# Patient Record
Sex: FEMALE | Race: WHITE | Employment: FULL TIME | URBAN - METROPOLITAN AREA
[De-identification: names, ages, dates, MRNs, and addresses within clinical notes are randomized per-mention and may not be internally consistent; named-entity substitution may affect disease eponyms.]

---

## 2017-05-15 DIAGNOSIS — N92.6 MISSED PERIODS: ICD-10-CM

## 2017-05-15 RX ORDER — NORGESTREL AND ETHINYL ESTRADIOL 0.3-0.03MG
KIT ORAL
Qty: 28 TABLET | Refills: 5 | Status: SHIPPED | OUTPATIENT
Start: 2017-05-15 | End: 2017-11-24 | Stop reason: SDUPTHER

## 2017-11-24 DIAGNOSIS — N92.6 MISSED PERIODS: ICD-10-CM

## 2017-11-24 RX ORDER — NORGESTREL AND ETHINYL ESTRADIOL 0.3-0.03MG
KIT ORAL
Qty: 28 TABLET | Refills: 1 | Status: SHIPPED | OUTPATIENT
Start: 2017-11-24 | End: 2018-01-19 | Stop reason: SDUPTHER

## 2018-01-02 ENCOUNTER — OFFICE VISIT (OUTPATIENT)
Dept: URGENT CARE | Facility: CLINIC | Age: 23
End: 2018-01-02
Payer: COMMERCIAL

## 2018-01-02 VITALS
BODY MASS INDEX: 21.6 KG/M2 | SYSTOLIC BLOOD PRESSURE: 135 MMHG | HEART RATE: 132 BPM | TEMPERATURE: 100 F | WEIGHT: 110 LBS | HEIGHT: 60 IN | DIASTOLIC BLOOD PRESSURE: 77 MMHG | OXYGEN SATURATION: 99 %

## 2018-01-02 DIAGNOSIS — J10.1 INFLUENZA A: Primary | ICD-10-CM

## 2018-01-02 LAB
CTP QC/QA: YES
FLUAV AG NPH QL: POSITIVE
FLUBV AG NPH QL: NEGATIVE

## 2018-01-02 PROCEDURE — 87804 INFLUENZA ASSAY W/OPTIC: CPT | Mod: QW,S$GLB,, | Performed by: EMERGENCY MEDICINE

## 2018-01-02 PROCEDURE — 99203 OFFICE O/P NEW LOW 30 MIN: CPT | Mod: S$GLB,,, | Performed by: EMERGENCY MEDICINE

## 2018-01-02 RX ORDER — FLUTICASONE PROPIONATE 50 MCG
SPRAY, SUSPENSION (ML) NASAL
Refills: 2 | COMMUNITY
Start: 2017-12-21 | End: 2018-05-14

## 2018-01-02 RX ORDER — PROMETHAZINE HYDROCHLORIDE AND CODEINE PHOSPHATE 6.25; 1 MG/5ML; MG/5ML
5 SOLUTION ORAL EVERY 4 HOURS PRN
Qty: 120 ML | Refills: 0 | Status: SHIPPED | OUTPATIENT
Start: 2018-01-02 | End: 2018-01-12

## 2018-01-02 RX ORDER — PREDNISONE 20 MG/1
20 TABLET ORAL DAILY
Qty: 3 TABLET | Refills: 0 | Status: SHIPPED | OUTPATIENT
Start: 2018-01-02 | End: 2018-01-05

## 2018-01-02 RX ORDER — AMOXICILLIN 875 MG/1
875 TABLET, FILM COATED ORAL 2 TIMES DAILY
Refills: 0 | COMMUNITY
Start: 2017-11-06 | End: 2018-05-14

## 2018-01-02 RX ORDER — FLUCONAZOLE 150 MG/1
TABLET ORAL
Refills: 0 | COMMUNITY
Start: 2017-11-22 | End: 2018-05-14

## 2018-01-02 RX ORDER — METHYLPREDNISOLONE 4 MG/1
TABLET ORAL
Refills: 0 | COMMUNITY
Start: 2017-11-06 | End: 2018-05-14

## 2018-01-02 NOTE — LETTER
January 2, 2018      [facility]  [address] [place] [address]  Phone: [phone]  Fax: [phone]       Patient: Bhavani Cespedes   YOB: 1995  Date of Visit: 01/02/2018    To Whom It May Concern:    Natalie Cespedes  was at [facility] on 01/02/2018. She may return to work/school on 01/05/2018 with no restrictions. If you have any questions or concerns, or if I can be of further assistance, please do not hesitate to contact me.    Sincerely,    Shlomo Winkler III, MD

## 2018-01-03 NOTE — PATIENT INSTRUCTIONS
Please drink plenty of fluids.  Please get plenty of rest.  Please return here or go to the Emergency Department for any concerns or worsening of condition.  If you were prescribed antibiotics, please take them to completion.  If you were given wait & see antibiotics, please wait 5-7 days before taking them, and only take them if your symptoms have worsened or not improved.  If you do begin taking the antibiotics, please take them to completion.  If you were prescribed a narcotic medication, do not drive or operate heavy equipment or machinery while taking these medications.  If you were given a steroid shot in the clinic and have also been given a prescription for a steroid such as Prednisone or a Medrol Dose Pack, please begin taking them tomorrow.  If you do not have Hypertension or any history of palpitations, it is ok to take over the counter Sudafed or Mucinex D or Allegra-D or Claritin-D or Zyrtec-D.  If you do take one of the above, it is ok to combine that with plain over the counter Mucinex or Allegra or Claritin or Zyrtec.  If for example you are taking Zyrtec -D, you can combine that with Mucinex, but not Mucinex-D.  If you are taking Mucinex-D, you can combine that with plain Allegra or Claritin or Zyrtec.   If you do have Hypertension or palpitations, it is safe to take Coricidin HBP for relief of sinus symptoms.  If not allergic, please take over the counter Tylenol (Acetaminophen) and/or Motrin (Ibuprofen) as directed for control of pain and/or fever.  Please follow up with your primary care doctor or specialist as needed.    If you  smoke, please stop smoking.      Influenza (Adult)    Influenza is also called the flu. It is a viral illness that affects the air passages of your lungs. It is different from the common cold. The flu can easily be passed from one to person to another. It may be spread through the air by coughing and sneezing. Or it can be spread by touching the sick person and then  touching your own eyes, nose, or mouth.  The flu starts 1 to 3 days after you are exposed to the flu virus. It may last for 1 to 2 weeks but many people feel tired or fatigued for many weeks afterward. You usually dont need to take antibiotics unless you have a complication. This might be an ear or sinus infection or pneumonia.  Symptoms of the flu may be mild or severe. They can include extreme tiredness (wanting to stay in bed all day), chills, fevers, muscle aches, soreness with eye movement, headache, and a dry, hacking cough.  Home care  Follow these guidelines when caring for yourself at home:  · Avoid being around cigarette smoke, whether yours or other peoples.  · Acetaminophen or ibuprofen will help ease your fever, muscle aches, and headache. Dont give aspirin to anyone younger than 18 who has the flu. Aspirin can harm the liver.  · Nausea and loss of appetite are common with the flu. Eat light meals. Drink 6 to 8 glasses of liquids every day. Good choices are water, sport drinks, soft drinks without caffeine, juices, tea, and soup. Extra fluids will also help loosen secretions in your nose and lungs.  · Over-the-counter cold medicines will not make the flu go away faster. But the medicines may help with coughing, sore throat, and congestion in your nose and sinuses. Dont use a decongestant if you have high blood pressure.  · Stay home until your fever has been gone for at least 24 hours without using medicine to reduce fever.  Follow-up care  Follow up with your healthcare provider, or as advised, if you are not getting better over the next week.  If you are age 65 or older, talk with your provider about getting a pneumococcal vaccine every 5 years. You should also get this vaccine if you have chronic asthma or COPD. All adults should get a flu vaccine every fall. Ask your provider about this.  When to seek medical advice  Call your healthcare provider right away if any of these occur:  · Cough with  lots of colored mucus (sputum) or blood in your mucus  · Chest pain, shortness of breath, wheezing, or trouble breathing  · Severe headache, or face, neck, or ear pain  · New rash with fever  · Fever of 100.4°F (38°C) or higher, or as directed by your healthcare provider  · Confusion, behavior change, or seizure  · Severe weakness or dizziness  · You get a new fever or cough after getting better for a few days  Date Last Reviewed: 1/1/2017  © 5426-2313 [employer]. [address] [place], [place] [address]. All rights reserved. This information is not intended as a substitute for professional medical care. Always follow your healthcare professional's instructions.

## 2018-01-03 NOTE — PROGRESS NOTES
Subjective:       Patient ID: Bhavani Cespedes is a 22 y.o. female.    Vitals:  height is 5' (1.524 m) and weight is 49.9 kg (110 lb). Her temperature is 100.4 °F (38 °C) (abnormal). Her blood pressure is 135/77 and her pulse is 132 (abnormal). Her oxygen saturation is 99%.     Chief Complaint: Cough and Nasal Congestion    Cough   This is a new problem. The current episode started in the past 7 days. The problem has been gradually worsening. The problem occurs every few minutes. The cough is non-productive. Associated symptoms include chills, a fever, headaches, nasal congestion, postnasal drip and a sore throat. Pertinent negatives include no chest pain, ear pain, eye redness, myalgias, rash, shortness of breath or wheezing. The symptoms are aggravated by lying down and cold air. She has tried nothing for the symptoms. The treatment provided no relief.     Review of Systems   Constitution: Positive for chills, decreased appetite, fever and malaise/fatigue.   HENT: Positive for congestion, postnasal drip and sore throat. Negative for ear pain and hoarse voice.    Eyes: Negative for blurred vision, discharge and redness.   Cardiovascular: Negative for chest pain, dyspnea on exertion and leg swelling.   Respiratory: Positive for cough and sputum production. Negative for shortness of breath and wheezing.    Skin: Negative for rash.   Musculoskeletal: Positive for back pain and joint pain. Negative for myalgias.   Gastrointestinal: Negative for abdominal pain, diarrhea, nausea and vomiting.   Neurological: Positive for headaches.        Frontal lobe   Psychiatric/Behavioral: The patient is not nervous/anxious.        Objective:      Physical Exam   Constitutional: She is oriented to person, place, and time. She appears well-developed and well-nourished. She is cooperative.  Non-toxic appearance. She does not appear ill. No distress.   Non productive cough present through out the exam     HENT:   Head: Normocephalic and  atraumatic.   Right Ear: Hearing, external ear and ear canal normal. A middle ear effusion is present.   Left Ear: Hearing, external ear and ear canal normal. A middle ear effusion is present.   Nose: Mucosal edema and rhinorrhea present. No nasal deformity. No epistaxis. Right sinus exhibits no maxillary sinus tenderness and no frontal sinus tenderness. Left sinus exhibits no maxillary sinus tenderness and no frontal sinus tenderness.   Mouth/Throat: Uvula is midline and mucous membranes are normal. No trismus in the jaw. Normal dentition. No uvula swelling. Posterior oropharyngeal erythema present.   Eyes: Conjunctivae and lids are normal. No scleral icterus.   Sclera clear bilat   Neck: Trachea normal, full passive range of motion without pain and phonation normal. Neck supple.   Cardiovascular: Normal rate, regular rhythm, normal heart sounds, intact distal pulses and normal pulses.    Pulmonary/Chest: Effort normal and breath sounds normal. No respiratory distress.   Abdominal: Soft. Normal appearance and bowel sounds are normal. She exhibits no distension. There is no tenderness.   Musculoskeletal: Normal range of motion. She exhibits no edema or deformity.   Neurological: She is alert and oriented to person, place, and time. She exhibits normal muscle tone. Coordination normal.   Skin: Skin is warm, dry and intact. She is not diaphoretic. No pallor.   Psychiatric: She has a normal mood and affect. Her speech is normal and behavior is normal. Judgment and thought content normal. Cognition and memory are normal.   Nursing note and vitals reviewed.      Assessment:       1. Influenza A        Plan:         Influenza A  -     POCT Influenza A/B  -     predniSONE (DELTASONE) 20 MG tablet; Take 1 tablet (20 mg total) by mouth once daily.  Dispense: 3 tablet; Refill: 0  -     promethazine-codeine 6.25-10 mg/5 ml (PHENERGAN WITH CODEINE) 6.25-10 mg/5 mL syrup; Take 5 mLs by mouth every 4 (four) hours as needed for  Cough.  Dispense: 120 mL; Refill: 0

## 2018-01-19 DIAGNOSIS — N92.6 MISSED PERIODS: ICD-10-CM

## 2018-01-19 RX ORDER — NORGESTREL AND ETHINYL ESTRADIOL 0.3-0.03MG
KIT ORAL
Qty: 28 TABLET | Refills: 1 | Status: SHIPPED | OUTPATIENT
Start: 2018-01-19 | End: 2018-02-26 | Stop reason: SDUPTHER

## 2018-01-28 ENCOUNTER — OFFICE VISIT (OUTPATIENT)
Dept: URGENT CARE | Facility: CLINIC | Age: 23
End: 2018-01-28
Payer: COMMERCIAL

## 2018-01-28 VITALS — WEIGHT: 105 LBS | HEIGHT: 60 IN | BODY MASS INDEX: 20.62 KG/M2

## 2018-01-28 DIAGNOSIS — K52.9 GASTROENTERITIS, ACUTE: ICD-10-CM

## 2018-01-28 DIAGNOSIS — R10.84 GENERALIZED ABDOMINAL PAIN: Primary | ICD-10-CM

## 2018-01-28 LAB
BILIRUB UR QL STRIP: NEGATIVE
GLUCOSE UR QL STRIP: NEGATIVE
KETONES UR QL STRIP: NEGATIVE
LEUKOCYTE ESTERASE UR QL STRIP: NEGATIVE
PH, POC UA: 5 (ref 5–8)
POC BLOOD, URINE: NEGATIVE
POC NITRATES, URINE: NEGATIVE
PROT UR QL STRIP: POSITIVE
SP GR UR STRIP: 1.01 (ref 1–1.03)
UROBILINOGEN UR STRIP-ACNC: NORMAL (ref 0.1–1.1)

## 2018-01-28 PROCEDURE — 96372 THER/PROPH/DIAG INJ SC/IM: CPT | Mod: S$GLB,,, | Performed by: INTERNAL MEDICINE

## 2018-01-28 PROCEDURE — 81003 URINALYSIS AUTO W/O SCOPE: CPT | Mod: QW,S$GLB,, | Performed by: INTERNAL MEDICINE

## 2018-01-28 PROCEDURE — 99213 OFFICE O/P EST LOW 20 MIN: CPT | Mod: 25,S$GLB,, | Performed by: INTERNAL MEDICINE

## 2018-01-28 RX ORDER — ONDANSETRON 4 MG/1
4 TABLET, FILM COATED ORAL EVERY 8 HOURS PRN
Qty: 20 TABLET | Refills: 0 | Status: SHIPPED | OUTPATIENT
Start: 2018-01-28 | End: 2018-05-14

## 2018-01-28 RX ORDER — ONDANSETRON 2 MG/ML
4 INJECTION INTRAMUSCULAR; INTRAVENOUS
Status: COMPLETED | OUTPATIENT
Start: 2018-01-28 | End: 2018-01-28

## 2018-01-28 RX ADMIN — ONDANSETRON 4 MG: 2 INJECTION INTRAMUSCULAR; INTRAVENOUS at 10:01

## 2018-01-28 NOTE — PROGRESS NOTES
Subjective:       Patient ID: Bhavani Cespedes is a 22 y.o. female.    Vitals:  height is 5' (1.524 m) and weight is 47.6 kg (105 lb).     Chief Complaint: Abdominal Pain; Nausea; and Emesis    Abdominal Pain   This is a new problem. The current episode started today. The problem occurs intermittently. The problem has been resolved (now having low back pain ). The pain is located in the left flank and right flank. The pain is at a severity of 6/10. The pain is moderate. The quality of the pain is aching and cramping. Associated symptoms include nausea and vomiting. Pertinent negatives include no diarrhea, fever or headaches. The pain is aggravated by certain positions, eating and vomiting. The pain is relieved by nothing. She has tried nothing for the symptoms. The treatment provided no relief.   Nausea   This is a new problem. The current episode started today. The problem occurs intermittently. The problem has been gradually worsening. Associated symptoms include abdominal pain, nausea and vomiting. Pertinent negatives include no chest pain, chills, fever, headaches, rash or sore throat. The symptoms are aggravated by walking and eating. She has tried nothing for the symptoms. The treatment provided no relief.   Emesis    This is a new problem. The current episode started today. The problem occurs less than 2 times per day. The problem has been gradually worsening. The emesis has an appearance of bile. There has been no fever. Associated symptoms include abdominal pain. Pertinent negatives include no chest pain, chills, diarrhea, fever or headaches. She has tried nothing for the symptoms. The treatment provided no relief.     Review of Systems   Constitution: Negative for chills and fever.   HENT: Negative for sore throat.    Eyes: Negative for blurred vision.   Cardiovascular: Negative for chest pain.   Respiratory: Negative for shortness of breath.    Skin: Negative for rash.   Musculoskeletal: Positive for back  pain. Negative for joint pain.   Gastrointestinal: Positive for abdominal pain, nausea and vomiting. Negative for diarrhea.   Neurological: Negative for headaches.   Psychiatric/Behavioral: The patient is not nervous/anxious.        Objective:      Physical Exam   Constitutional: She appears well-developed and well-nourished. She appears ill.   HENT:   Head: Normocephalic and atraumatic.   Eyes: Conjunctivae and EOM are normal. Pupils are equal, round, and reactive to light.   Neck: Normal range of motion. Neck supple.   Cardiovascular: Normal rate and regular rhythm.    Pulmonary/Chest: Effort normal and breath sounds normal.   Abdominal: Soft.   Mild generalized discomfort to palpation with some guarding in LLQ; no rebound;no masses or organomegaly   Vitals reviewed.      Assessment:       1. Generalized abdominal pain    2. Gastroenteritis, acute        Plan:         Generalized abdominal pain  -     POCT Urinalysis, Dipstick, Automated, W/O Scope    Gastroenteritis, acute  -     ondansetron injection 4 mg; Inject 4 mg into the muscle one time.  -     ondansetron (ZOFRAN, AS HYDROCHLORIDE,) 4 MG tablet; Take 1 tablet (4 mg total) by mouth every 8 (eight) hours as needed for Nausea.  Dispense: 20 tablet; Refill: 0

## 2018-02-26 ENCOUNTER — TELEPHONE (OUTPATIENT)
Dept: OBSTETRICS AND GYNECOLOGY | Facility: CLINIC | Age: 23
End: 2018-02-26

## 2018-02-26 DIAGNOSIS — N92.6 MISSED PERIODS: ICD-10-CM

## 2018-02-28 DIAGNOSIS — N92.6 MISSED PERIODS: ICD-10-CM

## 2018-03-23 DIAGNOSIS — N92.6 MISSED PERIODS: ICD-10-CM

## 2018-03-23 RX ORDER — NORGESTREL AND ETHINYL ESTRADIOL 0.3-0.03MG
KIT ORAL
Qty: 28 TABLET | Refills: 1 | Status: SHIPPED | OUTPATIENT
Start: 2018-03-23 | End: 2018-05-14 | Stop reason: SDUPTHER

## 2018-05-14 ENCOUNTER — OFFICE VISIT (OUTPATIENT)
Dept: OBSTETRICS AND GYNECOLOGY | Facility: CLINIC | Age: 23
End: 2018-05-14
Attending: OBSTETRICS & GYNECOLOGY
Payer: COMMERCIAL

## 2018-05-14 ENCOUNTER — LAB VISIT (OUTPATIENT)
Dept: LAB | Facility: OTHER | Age: 23
End: 2018-05-14
Attending: OBSTETRICS & GYNECOLOGY
Payer: COMMERCIAL

## 2018-05-14 VITALS
HEIGHT: 60 IN | WEIGHT: 108 LBS | BODY MASS INDEX: 21.2 KG/M2 | SYSTOLIC BLOOD PRESSURE: 116 MMHG | DIASTOLIC BLOOD PRESSURE: 80 MMHG

## 2018-05-14 DIAGNOSIS — Z01.419 ENCOUNTER FOR GYNECOLOGICAL EXAMINATION (GENERAL) (ROUTINE) WITHOUT ABNORMAL FINDINGS: Primary | ICD-10-CM

## 2018-05-14 DIAGNOSIS — N92.6 MISSED PERIODS: ICD-10-CM

## 2018-05-14 DIAGNOSIS — N92.0 MENORRHAGIA WITH REGULAR CYCLE: ICD-10-CM

## 2018-05-14 DIAGNOSIS — R53.83 FATIGUE, UNSPECIFIED TYPE: ICD-10-CM

## 2018-05-14 LAB
BASOPHILS # BLD AUTO: 0.01 K/UL
BASOPHILS NFR BLD: 0.2 %
DIFFERENTIAL METHOD: NORMAL
EOSINOPHIL # BLD AUTO: 0.1 K/UL
EOSINOPHIL NFR BLD: 2.6 %
ERYTHROCYTE [DISTWIDTH] IN BLOOD BY AUTOMATED COUNT: 12.7 %
HCT VFR BLD AUTO: 39.9 %
HGB BLD-MCNC: 13 G/DL
LYMPHOCYTES # BLD AUTO: 1.8 K/UL
LYMPHOCYTES NFR BLD: 35.3 %
MCH RBC QN AUTO: 30.3 PG
MCHC RBC AUTO-ENTMCNC: 32.6 G/DL
MCV RBC AUTO: 93 FL
MONOCYTES # BLD AUTO: 0.4 K/UL
MONOCYTES NFR BLD: 8.3 %
NEUTROPHILS # BLD AUTO: 2.7 K/UL
NEUTROPHILS NFR BLD: 53.6 %
PLATELET # BLD AUTO: 288 K/UL
PMV BLD AUTO: 11.6 FL
RBC # BLD AUTO: 4.29 M/UL
TSH SERPL DL<=0.005 MIU/L-ACNC: 0.81 UIU/ML
WBC # BLD AUTO: 5.07 K/UL

## 2018-05-14 PROCEDURE — 99999 PR PBB SHADOW E&M-EST. PATIENT-LVL III: CPT | Mod: PBBFAC,,, | Performed by: OBSTETRICS & GYNECOLOGY

## 2018-05-14 PROCEDURE — 85025 COMPLETE CBC W/AUTO DIFF WBC: CPT

## 2018-05-14 PROCEDURE — 99395 PREV VISIT EST AGE 18-39: CPT | Mod: S$GLB,,, | Performed by: OBSTETRICS & GYNECOLOGY

## 2018-05-14 PROCEDURE — 36415 COLL VENOUS BLD VENIPUNCTURE: CPT

## 2018-05-14 PROCEDURE — 84443 ASSAY THYROID STIM HORMONE: CPT

## 2018-05-14 RX ORDER — NORGESTREL AND ETHINYL ESTRADIOL 0.3-0.03MG
KIT ORAL
Qty: 28 TABLET | Refills: 1 | Status: SHIPPED | OUTPATIENT
Start: 2018-05-14 | End: 2018-08-30 | Stop reason: SDUPTHER

## 2018-05-14 NOTE — PROGRESS NOTES
Subjective:       Patient ID: Bhavani Cespedes is a 23 y.o. female.    Chief Complaint:  Well Woman (10/07/16-pap-negative )      History of Present Illness  23 year old here for annual exam.  Patient had heavy period but improved with her current birth control.  Moderate cramps for one day.  Patient worried about vulvar skin tags.  No pelvic pain or breat concerns.      GYN & OB History  Patient's last menstrual period was 2018.   Date of Last Pap: 10/13/2016    OB History    Para Term  AB Living   0 0 0 0 0 0   SAB TAB Ectopic Multiple Live Births   0 0 0 0               History reviewed. No pertinent past medical history.    History reviewed. No pertinent surgical history.    Review of Systems  Review of Systems   Constitutional: Negative for fatigue.   Respiratory: Negative for shortness of breath.    Cardiovascular: Negative for chest pain.   Gastrointestinal: Negative for abdominal pain, constipation, diarrhea and nausea.   Genitourinary: Negative for dyspareunia, dysuria, menstrual problem, pelvic pain and vaginal pain.   Musculoskeletal: Negative for back pain.   Skin: Negative for rash.   Neurological: Negative for headaches.   Hematological: Negative for adenopathy.   Psychiatric/Behavioral: Negative for dysphoric mood. The patient is not nervous/anxious.         Objective:   Physical Exam:   Constitutional: She is oriented to person, place, and time. She appears well-developed and well-nourished.      Neck: No thyromegaly present.    Cardiovascular: Normal rate.     Pulmonary/Chest: Effort normal and breath sounds normal. Right breast exhibits no mass, no nipple discharge, no skin change, no tenderness and no bleeding. Left breast exhibits no mass, no nipple discharge, no skin change, no tenderness and no bleeding.        Abdominal: Soft. Normal appearance and bowel sounds are normal. She exhibits no distension and no mass. There is no tenderness. There is no rebound and no guarding.      Genitourinary: Vagina normal and uterus normal.       Pelvic exam was performed with patient supine. There is no rash, tenderness, lesion or injury on the right labia. There is no rash, tenderness, lesion or injury on the left labia. Uterus is not enlarged, not fixed and not tender. Cervix is normal. Right adnexum displays no mass, no tenderness and no fullness. Left adnexum displays no mass, no tenderness and no fullness. No erythema, tenderness, rectocele, cystocele or unspecified prolapse of vaginal walls in the vagina. No signs of injury around the vagina. No vaginal discharge found. Cervix exhibits no motion tenderness, no discharge and no friability.           Musculoskeletal: Normal range of motion and moves all extremeties.      Lymphadenopathy:     She has no axillary adenopathy.        Right: No supraclavicular adenopathy present.        Left: No supraclavicular adenopathy present.    Neurological: She is alert and oriented to person, place, and time.    Skin: Skin is warm and dry.    Psychiatric: She has a normal mood and affect. Her behavior is normal. Judgment normal.        Assessment/ Plan:     Encounter for gynecological examination (general) (routine) without abnormal findings    Missed periods  -     CRYSELLE, 28, 0.3-30 mg-mcg per tablet; TAKE 1 TABLET BY MOUTH EVERY DAY. MUST BE THIS BRAND  Dispense: 28 tablet; Refill: 1    Menorrhagia with regular cycle  -     CBC auto differential; Future    Fatigue, unspecified type  -     TSH; Future; Expected date: 05/14/2018    will return for lesion removal      Follow-up in about 1 year (around 5/14/2019).    Patient was counseled today on A.C.S. Pap guidelines and recommendations for yearly pelvic exams, mammograms and monthly self breast exams; to see her PCP for other health maintenance.

## 2018-05-17 DIAGNOSIS — N92.6 MISSED PERIODS: ICD-10-CM

## 2018-05-17 RX ORDER — NORGESTREL AND ETHINYL ESTRADIOL 0.3-0.03MG
KIT ORAL
Qty: 28 TABLET | Refills: 1 | Status: SHIPPED | OUTPATIENT
Start: 2018-05-17 | End: 2018-07-11

## 2018-06-06 ENCOUNTER — PATIENT MESSAGE (OUTPATIENT)
Dept: OBSTETRICS AND GYNECOLOGY | Facility: CLINIC | Age: 23
End: 2018-06-06

## 2018-06-15 ENCOUNTER — PATIENT MESSAGE (OUTPATIENT)
Dept: OBSTETRICS AND GYNECOLOGY | Facility: CLINIC | Age: 23
End: 2018-06-15

## 2018-06-19 ENCOUNTER — PATIENT MESSAGE (OUTPATIENT)
Dept: OBSTETRICS AND GYNECOLOGY | Facility: CLINIC | Age: 23
End: 2018-06-19

## 2018-06-22 RX ORDER — SERTRALINE HYDROCHLORIDE 25 MG/1
25 TABLET, FILM COATED ORAL DAILY
Qty: 30 TABLET | Refills: 2 | Status: SHIPPED | OUTPATIENT
Start: 2018-06-22 | End: 2019-02-19

## 2018-07-11 ENCOUNTER — PROCEDURE VISIT (OUTPATIENT)
Dept: OBSTETRICS AND GYNECOLOGY | Facility: CLINIC | Age: 23
End: 2018-07-11
Attending: OBSTETRICS & GYNECOLOGY
Payer: COMMERCIAL

## 2018-07-11 VITALS — BODY MASS INDEX: 21.1 KG/M2 | DIASTOLIC BLOOD PRESSURE: 76 MMHG | SYSTOLIC BLOOD PRESSURE: 128 MMHG | WEIGHT: 108 LBS

## 2018-07-11 DIAGNOSIS — L91.8 SKIN TAG: Primary | ICD-10-CM

## 2018-07-11 PROCEDURE — 88305 TISSUE EXAM BY PATHOLOGIST: CPT | Performed by: PATHOLOGY

## 2018-07-11 PROCEDURE — 88342 IMHCHEM/IMCYTCHM 1ST ANTB: CPT | Mod: 26,,, | Performed by: PATHOLOGY

## 2018-07-18 NOTE — PROCEDURES
Procedures   After informed consent the patient was was given 5 cc of local lidocaine to the areas on her left posterior fornix where skin tags were noted.  The patient tolerated the removal of all 3 with Metzenbaum scissors.  Silver nitrate was used for hemostasis.  The patient tolerated the procedure and the specimens were sent to pathology.

## 2018-08-30 ENCOUNTER — PATIENT MESSAGE (OUTPATIENT)
Dept: OBSTETRICS AND GYNECOLOGY | Facility: CLINIC | Age: 23
End: 2018-08-30

## 2018-08-30 DIAGNOSIS — N92.6 MISSED PERIODS: ICD-10-CM

## 2018-08-30 RX ORDER — NORGESTREL AND ETHINYL ESTRADIOL 0.3-0.03MG
KIT ORAL
Qty: 84 TABLET | Refills: 2 | Status: SHIPPED | OUTPATIENT
Start: 2018-08-30 | End: 2019-02-19 | Stop reason: SDUPTHER

## 2018-09-02 DIAGNOSIS — N92.6 MISSED PERIODS: ICD-10-CM

## 2018-09-04 RX ORDER — NORGESTREL AND ETHINYL ESTRADIOL 0.3-0.03MG
KIT ORAL
Qty: 28 TABLET | Refills: 1 | Status: SHIPPED | OUTPATIENT
Start: 2018-09-04 | End: 2018-11-16 | Stop reason: SDUPTHER

## 2018-10-29 ENCOUNTER — PATIENT MESSAGE (OUTPATIENT)
Dept: OBSTETRICS AND GYNECOLOGY | Facility: CLINIC | Age: 23
End: 2018-10-29

## 2018-11-16 DIAGNOSIS — N92.6 MISSED PERIODS: ICD-10-CM

## 2018-11-16 RX ORDER — NORGESTREL AND ETHINYL ESTRADIOL 0.3-0.03MG
1 KIT ORAL DAILY
Qty: 84 TABLET | Refills: 3 | Status: SHIPPED | OUTPATIENT
Start: 2018-11-16 | End: 2020-01-24

## 2019-01-13 ENCOUNTER — PATIENT MESSAGE (OUTPATIENT)
Dept: OBSTETRICS AND GYNECOLOGY | Facility: CLINIC | Age: 24
End: 2019-01-13

## 2019-01-14 NOTE — TELEPHONE ENCOUNTER
Patient is aware that you are out of clinic today. I will offer her an appointment, but does she need an ultrasound?

## 2019-02-01 ENCOUNTER — TELEPHONE (OUTPATIENT)
Dept: OBSTETRICS AND GYNECOLOGY | Facility: CLINIC | Age: 24
End: 2019-02-01

## 2019-02-01 DIAGNOSIS — N94.6 DYSMENORRHEA: Primary | ICD-10-CM

## 2019-02-01 NOTE — TELEPHONE ENCOUNTER
Pt scheduled herself on the portal for today but there is no u/s available. Called pt to reschedule for next available appt with an u/s.

## 2019-02-05 ENCOUNTER — PATIENT MESSAGE (OUTPATIENT)
Dept: OBSTETRICS AND GYNECOLOGY | Facility: CLINIC | Age: 24
End: 2019-02-05

## 2019-02-19 ENCOUNTER — OFFICE VISIT (OUTPATIENT)
Dept: OBSTETRICS AND GYNECOLOGY | Facility: CLINIC | Age: 24
End: 2019-02-19
Attending: OBSTETRICS & GYNECOLOGY
Payer: COMMERCIAL

## 2019-02-19 ENCOUNTER — PATIENT MESSAGE (OUTPATIENT)
Dept: OBSTETRICS AND GYNECOLOGY | Facility: CLINIC | Age: 24
End: 2019-02-19

## 2019-02-19 VITALS
SYSTOLIC BLOOD PRESSURE: 124 MMHG | HEIGHT: 60 IN | WEIGHT: 115.5 LBS | BODY MASS INDEX: 22.68 KG/M2 | DIASTOLIC BLOOD PRESSURE: 76 MMHG

## 2019-02-19 DIAGNOSIS — N94.6 DYSMENORRHEA: Primary | ICD-10-CM

## 2019-02-19 PROCEDURE — 99213 PR OFFICE/OUTPT VISIT, EST, LEVL III, 20-29 MIN: ICD-10-PCS | Mod: S$GLB,,, | Performed by: OBSTETRICS & GYNECOLOGY

## 2019-02-19 PROCEDURE — 3008F PR BODY MASS INDEX (BMI) DOCUMENTED: ICD-10-PCS | Mod: CPTII,S$GLB,, | Performed by: OBSTETRICS & GYNECOLOGY

## 2019-02-19 PROCEDURE — 99213 OFFICE O/P EST LOW 20 MIN: CPT | Mod: S$GLB,,, | Performed by: OBSTETRICS & GYNECOLOGY

## 2019-02-19 PROCEDURE — 3008F BODY MASS INDEX DOCD: CPT | Mod: CPTII,S$GLB,, | Performed by: OBSTETRICS & GYNECOLOGY

## 2019-02-19 PROCEDURE — 99999 PR PBB SHADOW E&M-EST. PATIENT-LVL III: CPT | Mod: PBBFAC,,, | Performed by: OBSTETRICS & GYNECOLOGY

## 2019-02-19 PROCEDURE — 99999 PR PBB SHADOW E&M-EST. PATIENT-LVL III: ICD-10-PCS | Mod: PBBFAC,,, | Performed by: OBSTETRICS & GYNECOLOGY

## 2019-02-19 RX ORDER — LEVONORGESTREL / ETHINYL ESTRADIOL AND ETHINYL ESTRADIOL 150-30(84)
1 KIT ORAL DAILY
Qty: 90 EACH | Refills: 3 | Status: SHIPPED | OUTPATIENT
Start: 2019-02-19 | End: 2019-03-22

## 2019-02-19 RX ORDER — SERTRALINE HYDROCHLORIDE 25 MG/1
25 TABLET, FILM COATED ORAL DAILY
Qty: 30 TABLET | Refills: 2 | Status: SHIPPED | OUTPATIENT
Start: 2019-02-19 | End: 2021-09-28 | Stop reason: SDUPTHER

## 2019-02-21 NOTE — PROGRESS NOTES
Counseling Note:    Subjective:       Patient ID: Bhavani Cespedes is a 23 y.o. female.    Chief Complaint:  Dysmenorrhea (pt c/o worsening dysmenorrhea and BTB)      History of Present Illness  HPI  23 year old with 1-2 days of pelvic pain at the start of her periods.  Patient had a period 2/5 and is taking cryselle as a birth control pill.  Patient had worse pelvic pain with periods before medication.  Discussed ultrasound being normal.  Patient disliked depo provera for BTB and mirena.  Interested in trial of different extended birth control pill.  Not currently having any pain.  Reports no new sex partners.  No vaginal discharge.    Patient cleared by hematologist to be on birth control.  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  Her father does.    Moods slightly improved but reports sill having trouble with sleeping    GYN & OB History  Patient's last menstrual period was 02/05/2019 (approximate).   Date of Last Pap: 10/13/2016    History reviewed. No pertinent past medical history.    History reviewed. No pertinent surgical history.    Outpatient Encounter Medications as of 2/19/2019   Medication Sig Dispense Refill    CRYSELLE, 28, 0.3-30 mg-mcg per tablet Take 1 tablet by mouth once daily. 84 tablet 3    L norgest/e.estradiol-e.estrad (SEASONIQUE) 0.15 mg-30 mcg (84)/10 mcg (7) 3MPk Take 1 tablet by mouth once daily. 90 each 3    sertraline (ZOLOFT) 25 MG tablet Take 1 tablet (25 mg total) by mouth once daily. 30 tablet 2    [DISCONTINUED] CRYSELLE, 28, 0.3-30 mg-mcg per tablet TAKE 1 TABLET BY MOUTH EVERY DAY. MUST BE THIS BRAND 84 tablet 2    [DISCONTINUED] sertraline (ZOLOFT) 25 MG tablet Take 1 tablet (25 mg total) by mouth once daily. 30 tablet 2     No facility-administered encounter medications on file as of 2/19/2019.          Review of Systems  Review of Systems   Gastrointestinal: Negative for abdominal distention.   Genitourinary: Positive for pelvic pain (only with the first 2 days of her period.). Negative  for dyspareunia, dysuria, menstrual problem, vaginal bleeding and vaginal discharge.   Psychiatric/Behavioral: Positive for sleep disturbance. Negative for dysphoric mood. The patient is nervous/anxious.            Objective:      No acute distress.         Assessment:        1. Dysmenorrhea       Bhavani was seen today for dysmenorrhea.    Diagnoses and all orders for this visit:    Dysmenorrhea    Other orders  -     sertraline (ZOLOFT) 25 MG tablet; Take 1 tablet (25 mg total) by mouth once daily.  -     L norgest/e.estradiol-e.estrad (SEASONIQUE) 0.15 mg-30 mcg (84)/10 mcg (7) 3MPk; Take 1 tablet by mouth once daily.            Plan:      Discussed 15 minute appointment about possible causes of dysmenorrhea   Wants trial of seasonique  Will call if pain or moods worsen  Last pap and labs reviewed       This is a counseling appointment with greater than 50% of the time spent counseling.

## 2019-02-26 ENCOUNTER — PATIENT MESSAGE (OUTPATIENT)
Dept: OBSTETRICS AND GYNECOLOGY | Facility: CLINIC | Age: 24
End: 2019-02-26

## 2019-03-21 ENCOUNTER — PATIENT MESSAGE (OUTPATIENT)
Dept: OBSTETRICS AND GYNECOLOGY | Facility: CLINIC | Age: 24
End: 2019-03-21

## 2019-03-22 ENCOUNTER — PATIENT MESSAGE (OUTPATIENT)
Dept: OBSTETRICS AND GYNECOLOGY | Facility: CLINIC | Age: 24
End: 2019-03-22

## 2019-03-22 RX ORDER — LEVONORGESTREL / ETHINYL ESTRADIOL AND ETHINYL ESTRADIOL 150-30(84)
1 KIT ORAL DAILY
Qty: 90 EACH | Refills: 0 | Status: SHIPPED | OUTPATIENT
Start: 2019-03-22 | End: 2021-09-28

## 2020-01-23 DIAGNOSIS — N92.6 MISSED PERIODS: ICD-10-CM

## 2020-01-24 RX ORDER — NORGESTREL AND ETHINYL ESTRADIOL 0.3-0.03MG
KIT ORAL
Qty: 84 TABLET | Refills: 1 | Status: SHIPPED | OUTPATIENT
Start: 2020-01-24 | End: 2021-09-28

## 2021-04-16 ENCOUNTER — PATIENT MESSAGE (OUTPATIENT)
Dept: RESEARCH | Facility: HOSPITAL | Age: 26
End: 2021-04-16

## 2021-09-07 ENCOUNTER — OFFICE VISIT (OUTPATIENT)
Dept: PSYCHIATRY | Facility: CLINIC | Age: 26
End: 2021-09-07
Payer: COMMERCIAL

## 2021-09-07 DIAGNOSIS — F41.1 GENERALIZED ANXIETY DISORDER WITH PANIC ATTACKS: ICD-10-CM

## 2021-09-07 DIAGNOSIS — F41.0 GENERALIZED ANXIETY DISORDER WITH PANIC ATTACKS: ICD-10-CM

## 2021-09-07 PROCEDURE — 1159F MED LIST DOCD IN RCRD: CPT | Mod: CPTII,S$GLB,, | Performed by: SOCIAL WORKER

## 2021-09-07 PROCEDURE — 90837 PSYTX W PT 60 MINUTES: CPT | Mod: S$GLB,,, | Performed by: SOCIAL WORKER

## 2021-09-07 PROCEDURE — 90837 PR PSYCHOTHERAPY W/PATIENT, 60 MIN: ICD-10-PCS | Mod: S$GLB,,, | Performed by: SOCIAL WORKER

## 2021-09-07 PROCEDURE — 1159F PR MEDICATION LIST DOCUMENTED IN MEDICAL RECORD: ICD-10-PCS | Mod: CPTII,S$GLB,, | Performed by: SOCIAL WORKER

## 2021-09-28 ENCOUNTER — OFFICE VISIT (OUTPATIENT)
Dept: PSYCHIATRY | Facility: CLINIC | Age: 26
End: 2021-09-28
Payer: COMMERCIAL

## 2021-09-28 VITALS
SYSTOLIC BLOOD PRESSURE: 126 MMHG | BODY MASS INDEX: 22.86 KG/M2 | HEART RATE: 84 BPM | DIASTOLIC BLOOD PRESSURE: 77 MMHG | WEIGHT: 117.06 LBS

## 2021-09-28 DIAGNOSIS — F41.1 GENERALIZED ANXIETY DISORDER: Primary | ICD-10-CM

## 2021-09-28 PROCEDURE — 1159F PR MEDICATION LIST DOCUMENTED IN MEDICAL RECORD: ICD-10-PCS | Mod: CPTII,S$GLB,, | Performed by: PSYCHOLOGIST

## 2021-09-28 PROCEDURE — 99999 PR PBB SHADOW E&M-EST. PATIENT-LVL II: ICD-10-PCS | Mod: PBBFAC,,, | Performed by: PSYCHOLOGIST

## 2021-09-28 PROCEDURE — 3074F PR MOST RECENT SYSTOLIC BLOOD PRESSURE < 130 MM HG: ICD-10-PCS | Mod: CPTII,S$GLB,, | Performed by: PSYCHOLOGIST

## 2021-09-28 PROCEDURE — 90792 PR PSYCHIATRIC DIAGNOSTIC EVALUATION W/MEDICAL SERVICES: ICD-10-PCS | Mod: S$GLB,,, | Performed by: PSYCHOLOGIST

## 2021-09-28 PROCEDURE — 99999 PR PBB SHADOW E&M-EST. PATIENT-LVL II: CPT | Mod: PBBFAC,,, | Performed by: PSYCHOLOGIST

## 2021-09-28 PROCEDURE — 3078F PR MOST RECENT DIASTOLIC BLOOD PRESSURE < 80 MM HG: ICD-10-PCS | Mod: CPTII,S$GLB,, | Performed by: PSYCHOLOGIST

## 2021-09-28 PROCEDURE — 3008F PR BODY MASS INDEX (BMI) DOCUMENTED: ICD-10-PCS | Mod: CPTII,S$GLB,, | Performed by: PSYCHOLOGIST

## 2021-09-28 PROCEDURE — 3074F SYST BP LT 130 MM HG: CPT | Mod: CPTII,S$GLB,, | Performed by: PSYCHOLOGIST

## 2021-09-28 PROCEDURE — 1159F MED LIST DOCD IN RCRD: CPT | Mod: CPTII,S$GLB,, | Performed by: PSYCHOLOGIST

## 2021-09-28 PROCEDURE — 3078F DIAST BP <80 MM HG: CPT | Mod: CPTII,S$GLB,, | Performed by: PSYCHOLOGIST

## 2021-09-28 PROCEDURE — 3008F BODY MASS INDEX DOCD: CPT | Mod: CPTII,S$GLB,, | Performed by: PSYCHOLOGIST

## 2021-09-28 PROCEDURE — 90792 PSYCH DIAG EVAL W/MED SRVCS: CPT | Mod: S$GLB,,, | Performed by: PSYCHOLOGIST

## 2021-09-28 RX ORDER — SERTRALINE HYDROCHLORIDE 50 MG/1
50 TABLET, FILM COATED ORAL DAILY
Qty: 30 TABLET | Refills: 1 | Status: SHIPPED | OUTPATIENT
Start: 2021-10-05 | End: 2022-01-13 | Stop reason: SDUPTHER

## 2021-10-05 ENCOUNTER — OFFICE VISIT (OUTPATIENT)
Dept: PSYCHIATRY | Facility: CLINIC | Age: 26
End: 2021-10-05
Payer: COMMERCIAL

## 2021-10-05 DIAGNOSIS — F41.0 GENERALIZED ANXIETY DISORDER WITH PANIC ATTACKS: Primary | ICD-10-CM

## 2021-10-05 DIAGNOSIS — F41.1 GENERALIZED ANXIETY DISORDER WITH PANIC ATTACKS: Primary | ICD-10-CM

## 2021-10-05 PROCEDURE — 90837 PSYTX W PT 60 MINUTES: CPT | Mod: S$GLB,,, | Performed by: SOCIAL WORKER

## 2021-10-05 PROCEDURE — 90837 PR PSYCHOTHERAPY W/PATIENT, 60 MIN: ICD-10-PCS | Mod: S$GLB,,, | Performed by: SOCIAL WORKER

## 2021-10-05 PROCEDURE — 1159F MED LIST DOCD IN RCRD: CPT | Mod: CPTII,S$GLB,, | Performed by: SOCIAL WORKER

## 2021-10-05 PROCEDURE — 1159F PR MEDICATION LIST DOCUMENTED IN MEDICAL RECORD: ICD-10-PCS | Mod: CPTII,S$GLB,, | Performed by: SOCIAL WORKER

## 2021-10-12 ENCOUNTER — OFFICE VISIT (OUTPATIENT)
Dept: PSYCHIATRY | Facility: CLINIC | Age: 26
End: 2021-10-12
Payer: COMMERCIAL

## 2021-10-12 DIAGNOSIS — F41.1 GENERALIZED ANXIETY DISORDER WITH PANIC ATTACKS: ICD-10-CM

## 2021-10-12 DIAGNOSIS — F41.0 GENERALIZED ANXIETY DISORDER WITH PANIC ATTACKS: ICD-10-CM

## 2021-10-12 PROCEDURE — 90837 PSYTX W PT 60 MINUTES: CPT | Mod: S$GLB,,, | Performed by: SOCIAL WORKER

## 2021-10-12 PROCEDURE — 90837 PR PSYCHOTHERAPY W/PATIENT, 60 MIN: ICD-10-PCS | Mod: S$GLB,,, | Performed by: SOCIAL WORKER

## 2021-10-18 ENCOUNTER — PATIENT MESSAGE (OUTPATIENT)
Dept: PSYCHIATRY | Facility: CLINIC | Age: 26
End: 2021-10-18
Payer: COMMERCIAL

## 2021-10-18 PROBLEM — Z01.419 WELL WOMAN EXAM WITH ROUTINE GYNECOLOGICAL EXAM: Status: ACTIVE | Noted: 2021-10-18

## 2021-10-18 PROBLEM — F41.9 ANXIETY: Status: ACTIVE | Noted: 2021-10-18

## 2021-10-18 PROBLEM — F32.A DEPRESSION: Status: ACTIVE | Noted: 2021-10-18

## 2021-10-20 ENCOUNTER — PATIENT MESSAGE (OUTPATIENT)
Dept: PSYCHIATRY | Facility: CLINIC | Age: 26
End: 2021-10-20
Payer: COMMERCIAL

## 2021-10-26 ENCOUNTER — OFFICE VISIT (OUTPATIENT)
Dept: PSYCHIATRY | Facility: CLINIC | Age: 26
End: 2021-10-26
Payer: COMMERCIAL

## 2021-10-26 DIAGNOSIS — F41.1 GENERALIZED ANXIETY DISORDER: ICD-10-CM

## 2021-10-26 PROCEDURE — 90837 PSYTX W PT 60 MINUTES: CPT | Mod: S$GLB,,, | Performed by: SOCIAL WORKER

## 2021-10-26 PROCEDURE — 90837 PR PSYCHOTHERAPY W/PATIENT, 60 MIN: ICD-10-PCS | Mod: S$GLB,,, | Performed by: SOCIAL WORKER

## 2021-11-02 ENCOUNTER — OFFICE VISIT (OUTPATIENT)
Dept: PSYCHIATRY | Facility: CLINIC | Age: 26
End: 2021-11-02
Payer: COMMERCIAL

## 2021-11-02 DIAGNOSIS — F41.9 ANXIETY: ICD-10-CM

## 2021-11-02 PROCEDURE — 90837 PR PSYCHOTHERAPY W/PATIENT, 60 MIN: ICD-10-PCS | Mod: S$GLB,,, | Performed by: SOCIAL WORKER

## 2021-11-02 PROCEDURE — 90837 PSYTX W PT 60 MINUTES: CPT | Mod: S$GLB,,, | Performed by: SOCIAL WORKER

## 2021-11-11 ENCOUNTER — PATIENT MESSAGE (OUTPATIENT)
Dept: PSYCHIATRY | Facility: CLINIC | Age: 26
End: 2021-11-11
Payer: COMMERCIAL

## 2021-11-12 ENCOUNTER — PATIENT MESSAGE (OUTPATIENT)
Dept: PSYCHIATRY | Facility: CLINIC | Age: 26
End: 2021-11-12
Payer: COMMERCIAL

## 2021-11-26 ENCOUNTER — PATIENT MESSAGE (OUTPATIENT)
Dept: PSYCHIATRY | Facility: CLINIC | Age: 26
End: 2021-11-26
Payer: COMMERCIAL

## 2021-11-29 ENCOUNTER — PATIENT MESSAGE (OUTPATIENT)
Dept: PSYCHIATRY | Facility: CLINIC | Age: 26
End: 2021-11-29
Payer: COMMERCIAL

## 2021-11-30 ENCOUNTER — PATIENT MESSAGE (OUTPATIENT)
Dept: PSYCHIATRY | Facility: CLINIC | Age: 26
End: 2021-11-30
Payer: COMMERCIAL

## 2021-12-01 ENCOUNTER — PATIENT MESSAGE (OUTPATIENT)
Dept: PSYCHIATRY | Facility: CLINIC | Age: 26
End: 2021-12-01
Payer: COMMERCIAL

## 2021-12-07 ENCOUNTER — OFFICE VISIT (OUTPATIENT)
Dept: PSYCHIATRY | Facility: CLINIC | Age: 26
End: 2021-12-07
Payer: COMMERCIAL

## 2021-12-07 DIAGNOSIS — F41.9 ANXIETY: ICD-10-CM

## 2021-12-07 PROCEDURE — 99999 PR PBB SHADOW E&M-EST. PATIENT-LVL I: ICD-10-PCS | Mod: PBBFAC,,, | Performed by: SOCIAL WORKER

## 2021-12-07 PROCEDURE — 99999 PR PBB SHADOW E&M-EST. PATIENT-LVL I: CPT | Mod: PBBFAC,,, | Performed by: SOCIAL WORKER

## 2021-12-07 PROCEDURE — 90834 PSYTX W PT 45 MINUTES: CPT | Mod: S$GLB,,, | Performed by: SOCIAL WORKER

## 2021-12-07 PROCEDURE — 90834 PR PSYCHOTHERAPY W/PATIENT, 45 MIN: ICD-10-PCS | Mod: S$GLB,,, | Performed by: SOCIAL WORKER

## 2022-01-14 ENCOUNTER — PATIENT MESSAGE (OUTPATIENT)
Dept: PSYCHIATRY | Facility: CLINIC | Age: 27
End: 2022-01-14
Payer: COMMERCIAL

## 2022-05-02 ENCOUNTER — PATIENT MESSAGE (OUTPATIENT)
Dept: PSYCHIATRY | Facility: CLINIC | Age: 27
End: 2022-05-02
Payer: COMMERCIAL

## 2022-05-16 ENCOUNTER — PATIENT MESSAGE (OUTPATIENT)
Dept: PSYCHIATRY | Facility: CLINIC | Age: 27
End: 2022-05-16
Payer: COMMERCIAL

## 2022-05-16 DIAGNOSIS — F41.1 GENERALIZED ANXIETY DISORDER: ICD-10-CM

## 2022-05-16 RX ORDER — SERTRALINE HYDROCHLORIDE 50 MG/1
50 TABLET, FILM COATED ORAL DAILY
Qty: 30 TABLET | Refills: 0 | Status: SHIPPED | OUTPATIENT
Start: 2022-05-16 | End: 2022-06-17

## 2022-05-31 ENCOUNTER — PATIENT MESSAGE (OUTPATIENT)
Dept: PSYCHIATRY | Facility: CLINIC | Age: 27
End: 2022-05-31
Payer: COMMERCIAL

## 2022-06-09 ENCOUNTER — TELEPHONE (OUTPATIENT)
Dept: PSYCHIATRY | Facility: CLINIC | Age: 27
End: 2022-06-09
Payer: COMMERCIAL

## 2022-06-13 PROBLEM — F41.1 GAD (GENERALIZED ANXIETY DISORDER): Status: ACTIVE | Noted: 2021-10-18

## 2022-08-08 ENCOUNTER — OFFICE VISIT (OUTPATIENT)
Dept: PSYCHIATRY | Facility: CLINIC | Age: 27
End: 2022-08-08
Payer: COMMERCIAL

## 2022-08-08 ENCOUNTER — PATIENT MESSAGE (OUTPATIENT)
Dept: PSYCHIATRY | Facility: CLINIC | Age: 27
End: 2022-08-08

## 2022-08-08 DIAGNOSIS — F41.1 GAD (GENERALIZED ANXIETY DISORDER): Primary | ICD-10-CM

## 2022-08-08 DIAGNOSIS — F33.41 RECURRENT MAJOR DEPRESSIVE DISORDER, IN PARTIAL REMISSION: ICD-10-CM

## 2022-08-08 PROCEDURE — 99213 PR OFFICE/OUTPT VISIT, EST, LEVL III, 20-29 MIN: ICD-10-PCS | Mod: 95,,, | Performed by: PSYCHOLOGIST

## 2022-08-08 PROCEDURE — 1159F MED LIST DOCD IN RCRD: CPT | Mod: CPTII,95,, | Performed by: PSYCHOLOGIST

## 2022-08-08 PROCEDURE — 1159F PR MEDICATION LIST DOCUMENTED IN MEDICAL RECORD: ICD-10-PCS | Mod: CPTII,95,, | Performed by: PSYCHOLOGIST

## 2022-08-08 PROCEDURE — 99213 OFFICE O/P EST LOW 20 MIN: CPT | Mod: 95,,, | Performed by: PSYCHOLOGIST

## 2022-08-08 RX ORDER — SERTRALINE HYDROCHLORIDE 50 MG/1
50 TABLET, FILM COATED ORAL DAILY
Qty: 90 TABLET | Refills: 1 | Status: SHIPPED | OUTPATIENT
Start: 2022-08-08 | End: 2022-09-22

## 2022-08-08 NOTE — PROGRESS NOTES
Outpatient Psychiatry Follow-Up Visit    8/8/2022    Timeframe: Corona Virus Outbreak     The patient location is: Patient's car/ Patient reported that his/her location at the time of this visit was in the [facility]     Visit type: Virtual visit with synchronous audio and video     Each patient to whom he or she provides medical services by telehealth is: (1) informed of the relationship between the medical psychologist and patient and the respective role of any other health care provider with respect to management of the patient; and (2) notified that he or she may decline to receive medical services by telehealth and may withdraw from such care at any time.    I also informed patient of the following:   Kenzie Cabezas, PhD, MPAP:  [place] medical license number: [id number]    My contact info:  [facility] / 2nd Floor  [address] [place] [address]   Ph: [phone]    If technology issues, call office phone: Ph: [phone]  If crisis: Dial 911 or go to nearest Emergency Room (ER)  If questions related to privacy practices: contact [facility] Information Department: [phone]    Chief Complaint:  Bhavani Leung is a 27 y.o. female who presents today for follow-up of depression and anxiety.       Impressions/Plan from last visit from September 2021: Bhavani has prior anxiety and postpartum anxiety and depression. She has done well with Zoloft in the past and was recently started on it again. She will participate in therapy as well to work on exposure and response prevention, especially with driving. She is motivated and willing and prognosis is good.     · Medication Management: Discussed risks, benefits, and alternatives to treatment plan documented above with patient. I answered all patient questions related to this plan, and patient expressed understanding and agreement.   continue Zoloft 25 mg for another week, then increase to 50  "mg  · continue therapy--exposure and response prevention; work on anxiety reduction especially when driving     Follow up in about 4 weeks (around 10/26/2021).     Interval History and Content of Current Session: Bhavani attended her virtual visit--though she was driving initially and had to pull over. 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 She had been at [employer] [facility] and was having problems with male administration not understanding when she needed to be off to care for her son (he just turned 1 in July--his name is Nelson). She had been in therapy with Maribel Keller, but she had not seen her for a long time (and Maribel is no longer with [facility]). She had been there for 6 years. She has been on her medicine and believes that it helps. She had stopped around Mother's Day and "spired down." She really likes the new job, which has helped tremendously. She is not currently taking birth control, though she denied trying to get pregnant. She reported that she has her birth control and plans to start taking it again. We are continuing with Zoloft 50 mg.      GAD7 8/8/2022 10/28/2021 10/28/2021   1. Feeling nervous, anxious, or on edge? 0 1 1   2. Not being able to stop or control worrying? 0 3 3   3. Worrying too much about different things? 0 3 3   4. Trouble relaxing? 0 3 3   5. Being so restless that it is hard to sit still? 0 1 1   6. Becoming easily annoyed or irritable? 0 1 1   7. Feeling afraid as if something awful might happen? 0 1 1   JAYMIE-7 Score 0 13 13      0-4 = Minimal anxiety  5-9 = Mild anxiety  10-14 = Moderate anxiety  15-21 = Severe anxiety       Review of Systems   · PSYCHIATRIC: Pertinant items are noted in the narrative.    Past Medical, Family and Social History: The patient's past medical, family and social history have been reviewed and updated as appropriate within the electronic medical record - see encounter notes.      Current Outpatient Medications:     COVID-19 vacc, " mRNA,Pfizer,/PF (PFIZER COVID-19 VACCINE, EUA, IM), , Disp: , Rfl:     norethindrone-ethinyl estradiol (JUNEL FE 1/20) 1 mg-20 mcg (21)/75 mg (7) per tablet, Take 1 tablet by mouth once daily., Disp: 30 tablet, Rfl: 11    sertraline (ZOLOFT) 50 MG tablet, TAKE 1 TABLET BY MOUTH EVERY DAY, Disp: 30 tablet, Rfl: 1    Compliance: yes    Side effects: None    Risk Parameters:  Patient reports no suicidal ideation  Patient reports no homicidal ideation  Patient reports no self-injurious behavior  Patient reports no violent behavior    Exam (detailed: at least 9 elements; comprehensive: all 15 elements)   Constitutional  Vitals:  Most recent vital signs were reviewed.   Last 3 sets of Vitals    Vitals - 1 value per visit 2/19/2019 9/28/2021 10/18/2021   SYSTOLIC 124 126 110   DIASTOLIC 76 77 84   Pulse - 84 -   Temp - - -   SPO2 - - -   Weight (lb) 115.52 117.06 116   Weight (kg) 52.4 53.1 52.617   Height 60.000 - 61   BMI (Calculated) 22.6 - 21.9   VISIT REPORT - - -   Pain Score  - - -          General:  age appropriate, casually dressed, neatly groomed     Musculoskeletal  Muscle Strength/Tone:  no tremor, no tic   Gait & Station:  video visit     Psychiatric  Speech:  no latency; no press   Behavior: wnl   Mood & Affect:  euthymic  congruent and appropriate   Thought Process:  normal and logical   Associations:  intact   Thought Content:  normal, no suicidality, no homicidality, delusions, or paranoia   Insight:  has awareness of illness   Judgement: behavior is adequate to circumstances   Orientation:  grossly intact   Memory: intact for content of interview   Language: grossly intact   Attention Span & Concentration:  Grossly intact   Fund of Knowledge:  intact and appropriate to age and level of education     Assessment and Diagnosis   Status/Progress: Based on the examination today, the patient's problem(s) is/are well controlled.  New problems have not been presented today.   Co-morbidities are not complicating  management of the primary condition.  There are no active rule-out diagnoses for this patient at this time.     General Impression:     Encounter Diagnoses   Name Primary?    JAYMIE (generalized anxiety disorder) Yes    Recurrent major depressive disorder, in partial remission          Intervention/Counseling/Treatment Plan   · Medication Management: Discussed risks, benefits, and alternatives to treatment plan documented above with patient. I answered all patient questions related to this plan, and patient expressed understanding and agreement.   continue Zoloft 50 mg  · counseling if needed--will let me know if interested in another referral    Medication List with Changes/Refills   Current Medications    COVID-19 VACC, MRNA,PFIZER,/PF (PFIZER COVID-19 VACCINE, EUA, IM)        NORETHINDRONE-ETHINYL ESTRADIOL (JUNEL FE 1/20) 1 MG-20 MCG (21)/75 MG (7) PER TABLET    Take 1 tablet by mouth once daily.    SERTRALINE (ZOLOFT) 50 MG TABLET    TAKE 1 TABLET BY MOUTH EVERY DAY   Discontinued Medications    FLUOXETINE 20 MG CAPSULE    TAKE 1 CAPSULE BY MOUTH EVERY EVENING        Return to Clinic: 6 months, or sooner if needed    Time spent with pt including note preparation: 16 minutes       Kenzie Cabezas, PhD, MP  Advanced Practice Medical Psychologist  [facility]  [address]  [place] [address]  [phone]   [phone] fax

## 2022-08-08 NOTE — PATIENT INSTRUCTIONS
"[facility]   PATIENT INFORMATION    We appreciate the opportunity to participate in your medical care and hope the following protocols will make it easier for you to receive quality treatment in our department.    PUNCTUALITY: Your appointment is scheduled for a fixed amount of time, reserved especially for you.  To get the benefit of your appointment, please arrive at least 15 minutes early to allow time for traffic, parking and registration.  Should you arrive more than 15 minutes late to your appointment, you will be rescheduled in order to assure your clinician has adequate time to assess you and provide helpful care.      APPOINTMENTS: Appointments are made by the nursing/front office staff or through the patient portal. Providers do not have access  to schedule appointments. Walk in appointments are not available. FOR EMERGENCIES, PLEASE GO THE CLOSEST EMERGENCY ROOM.    CANCELLATION/MISSED APPOINTMENTS:   In order to receive quality care, all appointments must be kept.  If you are unable to keep an appointment, please reschedule at least 3 days prior if possible. Late cancellations (within 24 hours of the appointment) and repeated no-show appointments may result in dismissal from the clinic. After two no show/late cancellation visits, you will receive a notice letter, alerting you to keep visits to prevent department dismissal. If another visit is missed after receipt of the notice, you will be discharged from the clinic. This policy is in effect to allow for other individuals on a long waiting list to be seen as soon as possible. Unlike other branches of medicine where several individuals can be scheduled in a 30 minute time slot, only one individual can be scheduled in any time slot in Psychiatry.     MESSAGES: For simple questions/concerns, you may contact your individual providers electronically through the "[facility]" portal or by calling [phone] " with messages relayed via office staff. If relevant, include pharmacy name and phone number, date of last visit and next scheduled visit, phone number where you can be reached throughout the day, and whether leaving a voicemail or message on an answering machine is acceptable. Messages will be returned by the Medical Assistant or Office Staff after your provider has reviewed the message.  Please allow 24 hours for a returned message before leaving another message. Messages will be checked each workday (Monday through Friday) during office hours (8:00 a.m. and 5:00 p.m.) and returned at most within one business day.  You may leave a non-urgent message after hours. Note that psychotherapy and medication management are not appropriate by telephone or the patient portal.    PRESCRIPTION REFILLS:  Please communicate with your prescriber about any refills you need during your appointment. You may also request refills through the [facility] portal (preferred) or by calling the clinic. Prescriptions will be filled during office hours.     Please do not wait until you are completely out of medication to request refills. Same day refills are not always possible. Patients may experience symptoms of withdrawal if they run out of medications. The patient assumes all responsibility when there is an issue with non-compliance with follow-up appointments and medications.  Some medications are controlled and regulated by the FDA and [employer]. Some of these medications can not be refilled before 30 days and require a face to face appointment.     PAPERWORK REQUESTS: If you have any forms or letters that need to be completed by your doctor, please present these at the beginning of the appointment to ensure that information needed to complete them is obtained during the office visit. Paperwork will be returned within 7-10 business days. Staff will call you to  the paperwork when completed.    SPECIAL EVALUATIONS: Please note that our  "department is treatment-focused. As such, we focus on treatment-oriented evaluations and do not perform specialty or "forensic" evaluations. Examples are listed below.    Disability: We do not do disability evaluations.  Please contact Social Security Administration for evaluations and determinations. You will then sign releases allowing for records from your treatment providers to be forwarded to Social Security Administration to use in their evaluation.  Gun Permit: We do not offer Sound Judgment Evaluations or assessments leading to gun ownership, nor do we fill out or file paperwork relevant to owning, concealing or purchasing a firearm.  Emotional Support     Animals (CALEB): We do not provide documentation, including letters, to aid in the acclamation that an Emotional Support Animal is required. Note that ESAs are not trained to perform tasks or recognize particular signs or symptoms. Rather, they are distinguished by the close, emotional, and supportive bond between the animal and the owner.       SAMPLES: We do not provide samples of any medications. If you have financial difficulties and are on a limited income, you may qualify for Patient Assistance Programs from various pharmaceutical companies. This will require that you complete paperwork with your financial information, but this does not guarantee that the company will approve the application. Alternative medication options can be discussed.    REFERRALS/COORDINATION: You will be referred to other providers if we feel unable to adequately diagnose or treat your particular condition, or if collaboration with another provider would allow for better management of your condition.       Call In if problems  Call Report Side Effects   Encouraged to follow up with primary care / Gen Med MD for continued monitoring of general health and wellness  Call 911 Or go to ER if Acute Concerns (especially if any thoughts of harm to self or other)          Kenzie Cabezas, " PhD, MP  Advanced Practice Medical Psychologist  [facility]  [address]  [place] [address] [phone]   [phone] fax

## 2022-09-22 PROBLEM — N94.10 FEMALE DYSPAREUNIA: Status: ACTIVE | Noted: 2022-09-22

## 2022-09-22 PROBLEM — Z01.419 WELL WOMAN EXAM WITH ROUTINE GYNECOLOGICAL EXAM: Status: RESOLVED | Noted: 2021-10-18 | Resolved: 2022-09-22

## 2023-03-02 ENCOUNTER — TELEPHONE (OUTPATIENT)
Dept: PSYCHIATRY | Facility: CLINIC | Age: 28
End: 2023-03-02
Payer: COMMERCIAL

## 2023-03-06 ENCOUNTER — OFFICE VISIT (OUTPATIENT)
Dept: PSYCHIATRY | Facility: CLINIC | Age: 28
End: 2023-03-06
Payer: COMMERCIAL

## 2023-03-06 DIAGNOSIS — N94.10 PAIN IN FEMALE GENITALIA ON INTERCOURSE: ICD-10-CM

## 2023-03-06 DIAGNOSIS — F41.1 GAD (GENERALIZED ANXIETY DISORDER): Primary | ICD-10-CM

## 2023-03-06 DIAGNOSIS — F33.41 RECURRENT MAJOR DEPRESSIVE DISORDER, IN PARTIAL REMISSION: ICD-10-CM

## 2023-03-06 PROCEDURE — 99213 PR OFFICE/OUTPT VISIT, EST, LEVL III, 20-29 MIN: ICD-10-PCS | Mod: 95,,, | Performed by: PSYCHOLOGIST

## 2023-03-06 PROCEDURE — 90833 PSYTX W PT W E/M 30 MIN: CPT | Mod: 95,,, | Performed by: PSYCHOLOGIST

## 2023-03-06 PROCEDURE — 1159F PR MEDICATION LIST DOCUMENTED IN MEDICAL RECORD: ICD-10-PCS | Mod: CPTII,95,, | Performed by: PSYCHOLOGIST

## 2023-03-06 PROCEDURE — 99213 OFFICE O/P EST LOW 20 MIN: CPT | Mod: 95,,, | Performed by: PSYCHOLOGIST

## 2023-03-06 PROCEDURE — 90833 PR PSYCHOTHERAPY W/PATIENT W/E&M, 30 MIN (ADD ON): ICD-10-PCS | Mod: 95,,, | Performed by: PSYCHOLOGIST

## 2023-03-06 PROCEDURE — 1159F MED LIST DOCD IN RCRD: CPT | Mod: CPTII,95,, | Performed by: PSYCHOLOGIST

## 2023-03-06 RX ORDER — SERTRALINE HYDROCHLORIDE 50 MG/1
50 TABLET, FILM COATED ORAL DAILY
Qty: 90 TABLET | Refills: 1 | Status: SHIPPED | OUTPATIENT
Start: 2023-03-06 | End: 2024-02-05 | Stop reason: SDUPTHER

## 2023-03-06 NOTE — PATIENT INSTRUCTIONS

## 2023-03-06 NOTE — PROGRESS NOTES
"Outpatient Psychiatry Follow-Up Visit    3/6/2023    Timeframe: Corona Virus Outbreak     The patient location is: Patient's work/ Patient reported that his/her location at the time of this visit was in the [facility]     Visit type: Virtual visit with synchronous audio and video     Each patient to whom he or she provides medical services by telehealth is: (1) informed of the relationship between the medical psychologist and patient and the respective role of any other health care provider with respect to management of the patient; and (2) notified that he or she may decline to receive medical services by telehealth and may withdraw from such care at any time.    I also informed patient of the following:   Kenzie Cabezas, PhD, MPAP:  [place]: [id number]    My contact info:  [facility] / 2nd Floor  [address] [place] [address]   Ph: [phone]    If technology issues, call office phone: Ph: [phone]  If crisis: Dial 911 or go to nearest Emergency Room (ER)  If questions related to privacy practices: contact [employer] Information Department: [phone]    Chief Complaint:  Bhavani Leung is a 27 y.o. female who presents today for follow-up of depression and anxiety.       Impressions/Plan from last visit from August 2022: Bhavani attended her virtual visit--though she was driving initially and had to pull over. She is now working at [employer] in [employer] and likes it much better. She had been at [facility] and was having problems with male administration not understanding when she needed to be off to care for her son (he just turned 1 in July--his name is Nelson). She had been in therapy with Maribel Keller, but she had not seen her for a long time (and Maribel is no longer with [facility]). She had been there for 6 years. She has been on her medicine and believes that it helps. She had stopped around Mother's Day and "spired down." " She really likes the new job, which has helped tremendously. She is not currently taking birth control, though she denied trying to get pregnant. She reported that she has her birth control and plans to start taking it again. We are continuing with Zoloft 50 mg.     Interval History and Content of Current Session: Bhavani attended her virtual visit. We initially had problems connecting--my ipad and then the audio. She loves her new job (has been there since last June)--she is working at [employer] and believes that it has helped her mental state a lot. She has a life outside of work with the new job. Her son, Nelson, will be 2 in July. They are considering having another child, but they are not quite ready. She is still fearful of what she went through before with postpartum--encouraged her to discuss with her OB to continue on Zoloft. She has lower libido--she has talked to Dr. Guajardo, who recommended pelvic floor therapy. She said that sex is painful for her--she had a 3rd degree tear during her delivery. From her description, her lower interest in sex seems more specific to fear of pain from her delivery trauma/pain; even wearing tampons during her period is painful for her. She was encouraged to discuss the pain with tampons with Dr. Guajardo--and discuss continuing on her Zoloft throughout conception. We agreed to continue Zoloft as currently prescribed.    PSYCHOTHERAPY      Site: [facility]  Time: 16 minutes  Participants: Met with patient    Therapeutic Intervention Type: behavior modifying psychotherapy, supportive psychotherapy  Why chosen therapy is appropriate versus another modality: patient responds to this modality, evidence based practice    Target symptoms: depression, anxiety , delivery trauma  Primary focus: see above    Outcome monitoring methods: self-report, observation    Patient's response to intervention:  The patient's response to intervention is accepting.    Progress toward goals:  The  patient's progress toward goals is good.      GAD7 3/6/2023 8/8/2022 10/28/2021   1. Feeling nervous, anxious, or on edge? 1 0 1   2. Not being able to stop or control worrying? 0 0 3   3. Worrying too much about different things? 1 0 3   4. Trouble relaxing? 1 0 3   5. Being so restless that it is hard to sit still? 0 0 1   6. Becoming easily annoyed or irritable? 1 0 1   7. Feeling afraid as if something awful might happen? 0 0 1   JAYMIE-7 Score 4 0 13      0-4 = Minimal anxiety  5-9 = Mild anxiety  10-14 = Moderate anxiety  15-21 = Severe anxiety       Review of Systems   PSYCHIATRIC: Pertinant items are noted in the narrative.    Past Medical, Family and Social History: The patient's past medical, family and social history have been reviewed and updated as appropriate within the electronic medical record - see encounter notes.      Current Outpatient Medications:     COVID-19 vacc, mRNA,Pfizer,/PF (PFIZER COVID-19 VACCINE, EUA, IM), , Disp: , Rfl:     sertraline (ZOLOFT) 50 MG tablet, Take 1 tablet (50 mg total) by mouth once daily., Disp: 90 tablet, Rfl: 1    Compliance: yes    Side effects: see above    Risk Parameters:  Patient reports no suicidal ideation  Patient reports no homicidal ideation  Patient reports no self-injurious behavior  Patient reports no violent behavior    Exam (detailed: at least 9 elements; comprehensive: all 15 elements)   Constitutional  Vitals:  Most recent vital signs were reviewed.   Last 3 sets of Vitals    Vitals - 1 value per visit 9/28/2021 10/18/2021 9/22/2022   SYSTOLIC 126 110 120   DIASTOLIC 77 84 68   Pulse 84 - -   Temp - - -   SPO2 - - -   Weight (lb) 117.06 116 121   Weight (kg) 53.1 52.617 54.885   Height - 61 -   BMI (Calculated) - 21.9 -   VISIT REPORT - - -   Pain Score  - - -          General:  age appropriate, casually dressed, neatly groomed     Musculoskeletal  Muscle Strength/Tone:  no tremor, no tic   Gait & Station:  video visit     Psychiatric  Speech:  no  latency; no press   Behavior: wnl   Mood & Affect:  euthymic  congruent and appropriate   Thought Process:  normal and logical   Associations:  intact   Thought Content:  normal, no suicidality, no homicidality, delusions, or paranoia   Insight:  has awareness of illness   Judgement: behavior is adequate to circumstances   Orientation:  grossly intact   Memory: intact for content of interview   Language: grossly intact   Attention Span & Concentration:  Grossly intact   Fund of Knowledge:  intact and appropriate to age and level of education     Assessment and Diagnosis   Status/Progress: Based on the examination today, the patient's problem(s) is/are well controlled.  New problems have been presented today.   Co-morbidities are complicating management of the primary condition.  There are no active rule-out diagnoses for this patient at this time.     General Impression:     Encounter Diagnoses   Name Primary?    JAYMIE (generalized anxiety disorder) Yes    Recurrent major depressive disorder, in partial remission     Pain in female genitalia on intercourse        Intervention/Counseling/Treatment Plan   Medication Management: Discussed risks, benefits, and alternatives to treatment plan documented above with patient. I answered all patient questions related to this plan, and patient expressed understanding and agreement.   continue Zoloft 50 mg  counseling if needed  Continue with pelvic floor therapy  Talk with  about pain, etc. (As well as OB/GYN)    Medication List with Changes/Refills   Current Medications    COVID-19 VACC, MRNA,PFIZER,/PF (PFIZER COVID-19 VACCINE, EUA, IM)       Changed and/or Refilled Medications    Modified Medication Previous Medication    SERTRALINE (ZOLOFT) 50 MG TABLET sertraline (ZOLOFT) 50 MG tablet       Take 1 tablet (50 mg total) by mouth once daily.    Take 1 tablet (50 mg total) by mouth once daily.        Return to Clinic: 6 months, or sooner if needed      I spent an  additional 15 minutes performing E/M services with >50% spent on counseling, guidance, coordinating care (not Psychotherapy related) in addition to the 16 minutes performing Psychotherapy.    Time spent with pt including note preparation: 31 minutes       Kenzie Cabezas, PhD, MP  Advanced Practice Medical Psychologist  [facility] [address]  [place] [address]  [phone]   [phone] fax

## 2024-01-29 ENCOUNTER — PATIENT MESSAGE (OUTPATIENT)
Dept: PSYCHIATRY | Facility: CLINIC | Age: 29
End: 2024-01-29
Payer: COMMERCIAL

## 2024-02-01 ENCOUNTER — TELEPHONE (OUTPATIENT)
Dept: PSYCHIATRY | Facility: CLINIC | Age: 29
End: 2024-02-01
Payer: COMMERCIAL

## 2024-02-05 ENCOUNTER — OFFICE VISIT (OUTPATIENT)
Dept: PSYCHIATRY | Facility: CLINIC | Age: 29
End: 2024-02-05
Payer: COMMERCIAL

## 2024-02-05 DIAGNOSIS — F41.1 GAD (GENERALIZED ANXIETY DISORDER): Primary | ICD-10-CM

## 2024-02-05 DIAGNOSIS — F33.42 RECURRENT MAJOR DEPRESSIVE DISORDER, IN FULL REMISSION: ICD-10-CM

## 2024-02-05 PROCEDURE — 99213 OFFICE O/P EST LOW 20 MIN: CPT | Mod: 95,,, | Performed by: PSYCHOLOGIST

## 2024-02-05 RX ORDER — SERTRALINE HYDROCHLORIDE 50 MG/1
50 TABLET, FILM COATED ORAL DAILY
Qty: 90 TABLET | Refills: 1 | Status: SHIPPED | OUTPATIENT
Start: 2024-02-05 | End: 2024-08-03

## 2024-02-05 NOTE — PATIENT INSTRUCTIONS

## 2024-02-05 NOTE — PROGRESS NOTES
Outpatient Psychiatry Follow-Up Visit    2/5/2024    Timeframe: Corona Virus Outbreak     The patient location is: Patient's home/ Patient reported that his/her location at the time of this visit was in the [facility]     Visit type: Virtual visit with synchronous audio and video     Each patient to whom he or she provides medical services by telehealth is: (1) informed of the relationship between the medical psychologist and patient and the respective role of any other health care provider with respect to management of the patient; and (2) notified that he or she may decline to receive medical services by telehealth and may withdraw from such care at any time.    I also informed patient of the following:   Kenzie Cabezas, PhD, MPAP:  [place] medical license number: [id number]    My contact info:  [facility] / 2nd Floor  [address] [place] [address]   Ph: [phone]    If technology issues, call office phone: Ph: [phone]  If crisis: Dial 911 or go to nearest Emergency Room (ER)  If questions related to privacy practices: contact [facility] Information Department: [phone]    Chief Complaint:  Bhavani Leung is a 28 y.o. female who presents today for follow-up of depression and anxiety.       Impressions/Plan from last visit: Bhavani attended her virtual visit. We initially had problems connecting--my ipad and then the audio. She loves her new job (has been there since last June)--she is working at [employer] and believes that it has helped her mental state a lot. She has a life outside of work with the new job. Her son, Nelson, will be 2 in July. They are considering having another child, but they are not quite ready. She is still fearful of what she went through before with postpartum--encouraged her to discuss with her OB to continue on Zoloft. She has lower libido--she has talked to Dr. Guajardo, who recommended pelvic floor therapy. She said that  "sex is painful for her--she had a 3rd degree tear during her delivery. From her description, her lower interest in sex seems more specific to fear of pain from her delivery trauma/pain; even wearing tampons during her period is painful for her. She was encouraged to discuss the pain with tampons with Dr. Guajardo--and discuss continuing on her Zoloft throughout conception. We agreed to continue Zoloft as currently prescribed.    Interval History and Content of Current Session: Bhavani attended her virtual visit. We last met in March of 2023. She is happy with her Zoloft. She had thought that she was pregnant but then thought that she may have had a "chemical pregnancy". She will be going to check at [facility] to ensure that she did have a miscarriage. She and her  were trying to get pregnant and had checked even prior to her missing her period. She was "upset" when she started bleeding, but she believes that the Zoloft was helpful for her in handling it. She is doing very well overall--we agreed to continue as prescribed. She is aware of the potential risks with taking Zoloft during pregnancy.    Plan--continue Zoloft 50 mg    Son (Nelson)   (Michael)          2/5/2024     9:06 AM 3/6/2023     8:36 AM 8/8/2022     7:01 AM   GAD7   1. Feeling nervous, anxious, or on edge? 0 1 0   2. Not being able to stop or control worrying? 0 0 0   3. Worrying too much about different things? 0 1 0   4. Trouble relaxing? 0 1 0   5. Being so restless that it is hard to sit still? 0 0 0   6. Becoming easily annoyed or irritable? 0 1 0   7. Feeling afraid as if something awful might happen? 0 0 0   JAYMIE-7 Score 0 4 0      0-4 = Minimal anxiety  5-9 = Mild anxiety  10-14 = Moderate anxiety  15-21 = Severe anxiety       Review of Systems   PSYCHIATRIC: Pertinant items are noted in the narrative.    Past Medical, Family and Social History: The patient's past medical, family and social history have been reviewed and updated as " "appropriate within the electronic medical record - see encounter notes.      Current Outpatient Medications:     sertraline (ZOLOFT) 50 MG tablet, Take 1 tablet (50 mg total) by mouth once daily., Disp: 90 tablet, Rfl: 1    Compliance: yes    Side effects: see above    Risk Parameters:  Patient reports no suicidal ideation  Patient reports no homicidal ideation  Patient reports no self-injurious behavior  Patient reports no violent behavior    Exam (detailed: at least 9 elements; comprehensive: all 15 elements)   Constitutional  Vitals:  Most recent vital signs were reviewed.   Last 3 sets of Vitals        9/28/2021     2:13 PM 10/18/2021    12:09 PM 9/22/2022     3:07 PM   Vitals - 1 value per visit   SYSTOLIC 126 110 120   DIASTOLIC 77 84 68   Pulse 84     Weight (lb) 117.06 116 121   Weight (kg) 53.1 52.617 54.885   Height  5' 1" (1.549 m)    BMI (Calculated)  21.9           General:  age appropriate, casually dressed, neatly groomed     Musculoskeletal  Muscle Strength/Tone:  no tremor, no tic   Gait & Station:  video visit     Psychiatric  Speech:  no latency; no press   Behavior: wnl   Mood & Affect:  euthymic  congruent and appropriate   Thought Process:  normal and logical   Associations:  intact   Thought Content:  normal, no suicidality, no homicidality, delusions, or paranoia   Insight:  has awareness of illness   Judgement: behavior is adequate to circumstances   Orientation:  grossly intact   Memory: intact for content of interview   Language: grossly intact   Attention Span & Concentration:  Grossly intact   Fund of Knowledge:  intact and appropriate to age and level of education     Assessment and Diagnosis   Status/Progress: Based on the examination today, the patient's problem(s) is/are well controlled.  New problems have been presented today.   Co-morbidities and psychosocial stressors  are complicating management of the primary condition.  There are no active rule-out diagnoses for this patient at " this time.     General Impression:     Encounter Diagnoses   Name Primary?    JAYMIE (generalized anxiety disorder) Yes    Recurrent major depressive disorder, in full remission    Possible recent miscarriage      Intervention/Counseling/Treatment Plan   Medication Management: Discussed risks, benefits, and alternatives to treatment plan documented above with patient. I answered all patient questions related to this plan, and patient expressed understanding and agreement.   continue Zoloft 50 mg  counseling if needed  Follow-up with OB/GYN regarding recent possible miscarriage      Medication List with Changes/Refills   Changed and/or Refilled Medications    Modified Medication Previous Medication    SERTRALINE (ZOLOFT) 50 MG TABLET sertraline (ZOLOFT) 50 MG tablet       Take 1 tablet (50 mg total) by mouth once daily.    Take 1 tablet (50 mg total) by mouth once daily.   Discontinued Medications    COVID-19 VACC, MRNA,PFIZER,/PF (PFIZER COVID-19 VACCINE, EUA, IM)            Return to Clinic: 6 months, or sooner if needed      Time spent with pt including note preparation: 15 minutes       Kenzie Cabezas, PhD, MP  Advanced Practice Medical Psychologist  [facility]  [address] [place] [address] [facility].  [place] [id number] [phone]   [phone] fax

## 2024-02-12 PROBLEM — O03.9 SAB (SPONTANEOUS ABORTION): Status: ACTIVE | Noted: 2024-02-12

## 2024-07-23 ENCOUNTER — TELEPHONE (OUTPATIENT)
Dept: PSYCHIATRY | Facility: CLINIC | Age: 29
End: 2024-07-23
Payer: COMMERCIAL

## 2024-07-25 ENCOUNTER — OFFICE VISIT (OUTPATIENT)
Dept: PSYCHIATRY | Facility: CLINIC | Age: 29
End: 2024-07-25
Payer: COMMERCIAL

## 2024-07-25 VITALS
WEIGHT: 132.5 LBS | SYSTOLIC BLOOD PRESSURE: 116 MMHG | BODY MASS INDEX: 25.03 KG/M2 | HEART RATE: 71 BPM | DIASTOLIC BLOOD PRESSURE: 82 MMHG

## 2024-07-25 DIAGNOSIS — F41.1 GAD (GENERALIZED ANXIETY DISORDER): ICD-10-CM

## 2024-07-25 DIAGNOSIS — F33.42 RECURRENT MAJOR DEPRESSIVE DISORDER, IN FULL REMISSION: ICD-10-CM

## 2024-07-25 DIAGNOSIS — Z63.0 PARTNER RELATIONAL PROBLEM: Primary | ICD-10-CM

## 2024-07-25 PROCEDURE — 99999 PR PBB SHADOW E&M-EST. PATIENT-LVL III: CPT | Mod: PBBFAC,,, | Performed by: PSYCHOLOGIST

## 2024-07-25 RX ORDER — SERTRALINE HYDROCHLORIDE 50 MG/1
50 TABLET, FILM COATED ORAL DAILY
Qty: 90 TABLET | Refills: 1 | Status: SHIPPED | OUTPATIENT
Start: 2024-07-25 | End: 2025-01-21

## 2024-07-25 SDOH — SOCIAL DETERMINANTS OF HEALTH (SDOH): PROBLEMS IN RELATIONSHIP WITH SPOUSE OR PARTNER: Z63.0

## 2024-07-25 NOTE — PROGRESS NOTES
"Outpatient Psychiatry Follow-Up Visit    7/25/2024      Chief Complaint:  Bhavani Leung is a 29 y.o. female who presents today for follow-up of depression and anxiety.       LAST VISIT: Bhavani attended her virtual visit. We last met in March of 2023. She is happy with her Zoloft. She had thought that she was pregnant but then thought that she may have had a "chemical pregnancy". She will be going to check at [facility] to ensure that she did have a miscarriage. She and her  were trying to get pregnant and had checked even prior to her missing her period. She was "upset" when she started bleeding, but she believes that the Zoloft was helpful for her in handling it. She is doing very well overall--we agreed to continue as prescribed. She is aware of the potential risks with taking Zoloft during pregnancy.    Plan--continue Zoloft 50 mg    CURRENT PRESENTATION: Bhavani attended her visit. Since we last met (Feb), she and her  have . There were some problems with her 's behavior and his job. It is a mutual decision (though she left); very amicable. They have Damon in the house, and each of them leave the house on their off nights. They are on a 2-2-3 system right now. They do plan to sell their house; she is planning to rent an apartment. She is much happier now that she is --much of the load in the house fell on her. Michael is a good daddy to Damon. They all get along well (in laws). We agreed to continue her medicine.     Plan--continue Zoloft 50 mg; referral for therapy    Son (Nelson) (turned 3 in July)  Estranged  (Michael)  Works at [employer] in [place]--she loves it.  _________________________________________________________    PSYCHOTHERAPY      Site: [facility]  Time: 16 minutes  Participants: Met with patient    Therapeutic Intervention Type: behavior modifying psychotherapy, supportive psychotherapy  Why chosen therapy is appropriate versus another " modality: patient responds to this modality, evidence based practice    Target symptoms: adjustment, relational  Primary focus: see above    Outcome monitoring methods: self-report, observation    Patient's response to intervention:  The patient's response to intervention is accepting.    Progress toward goals:  The patient's progress toward goals is excellent.          2/5/2024     9:06 AM 3/6/2023     8:36 AM 8/8/2022     7:01 AM   GAD7   1. Feeling nervous, anxious, or on edge? 0 1 0   2. Not being able to stop or control worrying? 0 0 0   3. Worrying too much about different things? 0 1 0   4. Trouble relaxing? 0 1 0   5. Being so restless that it is hard to sit still? 0 0 0   6. Becoming easily annoyed or irritable? 0 1 0   7. Feeling afraid as if something awful might happen? 0 0 0   JAYMIE-7 Score 0 4 0      0-4 = Minimal anxiety  5-9 = Mild anxiety  10-14 = Moderate anxiety  15-21 = Severe anxiety       Review of Systems   PSYCHIATRIC: Pertinant items are noted in the narrative.    Past Medical, Family and Social History: The patient's past medical, family and social history have been reviewed and updated as appropriate within the electronic medical record - see encounter notes.      Current Outpatient Medications:     sertraline (ZOLOFT) 50 MG tablet, Take 1 tablet (50 mg total) by mouth once daily., Disp: 90 tablet, Rfl: 1    Compliance: yes    Side effects: see above    Risk Parameters:  Patient reports no suicidal ideation  Patient reports no homicidal ideation  Patient reports no self-injurious behavior  Patient reports no violent behavior    Exam (detailed: at least 9 elements; comprehensive: all 15 elements)   Constitutional  Vitals:  Most recent vital signs were reviewed.   Last 3 sets of Vitals        9/22/2022     3:07 PM 2/12/2024     9:17 AM 7/25/2024     8:34 AM   Vitals - 1 value per visit   SYSTOLIC 120 120 116   DIASTOLIC 68 62 82   Pulse   71   Weight (lb) 121 135 132.5   Weight (kg) 54.885  "61.236 60.1   Height  5' 1" (1.549 m)    BMI (Calculated)  25.5           General:  age appropriate, well dressed, neatly groomed, hair up     Musculoskeletal  Muscle Strength/Tone:  no tremor, no tic   Gait & Station:  Non-ataxic     Psychiatric  Speech:  no latency; no press   Behavior: wnl   Mood & Affect:  euthymic  congruent and appropriate   Thought Process:  normal and logical   Associations:  intact   Thought Content:  normal, no suicidality, no homicidality, delusions, or paranoia   Insight:  has awareness of illness   Judgement: behavior is adequate to circumstances   Orientation:  grossly intact   Memory: intact for content of interview   Language: grossly intact   Attention Span & Concentration:  Grossly intact   Fund of Knowledge:  intact and appropriate to age and level of education     Assessment and Diagnosis   Status/Progress: Based on the examination today, the patient's problem(s) is/are fairly well controlled.  New problems have been presented today.   Co-morbidities and psychosocial stressors  are complicating management of the primary condition.  There are no active rule-out diagnoses for this patient at this time.     General Impression:     Encounter Diagnoses   Name Primary?    Partner relational problem Yes    JAYMIE (generalized anxiety disorder)     Recurrent major depressive disorder, in full remission        Intervention/Counseling/Treatment Plan   Medication Management: Discussed risks, benefits, and alternatives to treatment plan documented above with patient. I answered all patient questions related to this plan, and patient expressed understanding and agreement.   continue Zoloft 50 mg  counseling referral        Medication List with Changes/Refills   Changed and/or Refilled Medications    Modified Medication Previous Medication    SERTRALINE (ZOLOFT) 50 MG TABLET sertraline (ZOLOFT) 50 MG tablet       Take 1 tablet (50 mg total) by mouth once daily.    Take 1 tablet (50 mg total) by " mouth once daily.        Return to Clinic: 6 months, or sooner if needed      I spent an additional 13 minutes performing E/M services with >50% spent on counseling, guidance, coordinating care (not Psychotherapy related) in addition to the 16 minutes performing Psychotherapy.    Time spent with pt including note preparation: 29 minutes       Kenzie Cabezas, PhD, MP  Advanced Practice Medical Psychologist  [facility]  [address] [facility] [address]  [place] [id number] [phone]   [phone] fax

## 2024-12-14 ENCOUNTER — PATIENT MESSAGE (OUTPATIENT)
Dept: PSYCHIATRY | Facility: CLINIC | Age: 29
End: 2024-12-14
Payer: COMMERCIAL